# Patient Record
Sex: FEMALE | Race: BLACK OR AFRICAN AMERICAN | Employment: UNEMPLOYED
[De-identification: names, ages, dates, MRNs, and addresses within clinical notes are randomized per-mention and may not be internally consistent; named-entity substitution may affect disease eponyms.]

---

## 2024-08-08 ENCOUNTER — HOSPITAL ENCOUNTER (OUTPATIENT)
Facility: HOSPITAL | Age: 44
Setting detail: RECURRING SERIES
Discharge: HOME | End: 2024-08-11
Payer: OTHER GOVERNMENT

## 2024-08-08 PROCEDURE — 97535 SELF CARE MNGMENT TRAINING: CPT

## 2024-08-08 PROCEDURE — 97110 THERAPEUTIC EXERCISES: CPT

## 2024-08-08 PROCEDURE — 97161 PT EVAL LOW COMPLEX 20 MIN: CPT

## 2024-08-08 NOTE — PROGRESS NOTES
PHYSICAL / OCCUPATIONAL THERAPY - DAILY TREATMENT NOTE (updated )    Patient Name: Yolande Ryan    Date: 2024    : 1980  Insurance: Payor: [employer] / Plan: [employer] / Product Type: *No Product type* /      Patient  verified Yes     Visit #   Current / Total 1 15   Time   In / Out 8:22 9:10   Pain   In / Out 0 0   Subjective Functional Status/Changes: See POC     TREATMENT AREA =  Muscle weakness (generalized) [M62.81]    OBJECTIVE    24 min   Eval - untimed                      Therapeutic Procedures:  Tx Min Billable or 1:1 Min (if diff from Tx Min) Procedure, Rationale, Specifics   12  79753 Therapeutic Exercise (timed):  increase ROM, strength, coordination, balance, and proprioception to improve patient's ability to progress to PLOF and address remaining functional goals. (see flow sheet as applicable)     Details if applicable:  HEP instruction and demonstration     12  62533 Self Care/Home Management (timed):  improve patient knowledge and understanding of pain reducing techniques, positioning, posture/ergonomics, home safety, activity modification, diagnosis/prognosis, and physical therapy expectations, procedures and progression  to improve patient's ability to progress to PLOF and address remaining functional goals.  (see flow sheet as applicable)     Details if applicable:  pt education on relevant anatomy/physiology, pt education regarding prognosis with therapy, pt education on the possibility of obtaining an AFO to help with the lack of left ankle DF.    24  [facility] Totals Reminder: bill using total billable min of TIMED therapeutic procedures (example: do not include dry needle or estim unattended, both untimed codes, in totals to left)  8-22 min = 1 unit; 23-37 min = 2 units; 38-52 min = 3 units; 53-67 min = 4 units; 68-82 min = 5 units   Total Total     TOTAL TREATMENT TIME:        48     [x]  Patient Education billed concurrently with other procedures   [x] Review HEP    [] 
POC ; Examination:  MEDIUM Complexity : 3 Standardized tests and measures addressin body structure, function, activity limitation and / or participation in recreation  ;Presentation:  LOW Complexity : Stable, uncomplicated  ;Clinical Decision Making: Other Assessment Form or Objective Measure: TUG 20 seconds; 30 Second Sit to stand 3.5 = MODERATE Complexity  Overall Complexity Rating: LOW   Problem List: pain affecting function, decrease ROM, decrease strength, edema affection function, impaired gait/balance, decrease ADL/functional abilities, decrease activity tolerance, decrease flexibility/joint mobility, and decrease transfer abilities    Treatment Plan may include any combination of the followin Therapeutic Exercise, 37854 Neuromuscular Re-Education, 56871 Manual Therapy, 08900 Therapeutic Activity, 92242 Self Care/Home Management, 72572 Electrical Stim unattended, 39320 Electrical Stim attended, and 15233 Gait Training  Patient / Family readiness to learn indicated by: asking questions, trying to perform skills, interest, return verbalization , and return demonstration   Persons(s) to be included in education: patient (P)  Barriers to Learning/Limitations: none  Measures taken if barriers to learning present: n/a  Patient Goal (s): strengthen/improvement  Patient Self Reported Health Status: good  Rehabilitation Potential: good    Short Term Goals: To be accomplished in 2 treatments   Pt will report compliance and independence to HEP to help the pt manage their pain and symptoms.  Status at last note/certification:  established  Long Term Goals: To be accomplished in 15 treatments  Pt will improve her TUG time to 15 seconds with no AD  to improve the pt's fall risk  Status at last note/certification: 20 seconds, slow sit to stand, left foot drag  2.  Pt will increase MMT left hip flex to 3+/5 and knee EXT to 4-/5 to improve ability to return to PLOF.  Status at last note/certification: hip flex 3-/5,

## 2024-08-20 ENCOUNTER — HOSPITAL ENCOUNTER (OUTPATIENT)
Facility: HOSPITAL | Age: 44
Setting detail: RECURRING SERIES
Discharge: HOME | End: 2024-08-23
Payer: OTHER GOVERNMENT

## 2024-08-20 PROCEDURE — 97530 THERAPEUTIC ACTIVITIES: CPT | Performed by: GENERAL ACUTE CARE HOSPITAL

## 2024-08-20 PROCEDURE — 97112 NEUROMUSCULAR REEDUCATION: CPT | Performed by: GENERAL ACUTE CARE HOSPITAL

## 2024-08-20 NOTE — PROGRESS NOTES
PHYSICAL / OCCUPATIONAL THERAPY - DAILY TREATMENT NOTE (updated )    Patient Name: Yolande Ryan    Date: 2024    : 1980  Insurance: Payor: [employer] / Plan: [employer] / Product Type: *No Product type* /      Patient  verified yes     Visit #   Current / Total 2 15   Time   In / Out 904 953   Total Treatment Time 49   Pain   In / Out /10 LUE  0   Subjective Functional Status/Changes: Patient states her left shoulder/arm hurt a little today from sleeping in the wrong position.      NEXT PROGRESS NOTE DUE: 2024    TREATMENT AREA =  Muscle weakness (generalized) [M62.81]  Other abnormalities of gait and mobility [R26.89]    OBJECTIVE    Modalities Rationale:     decrease inflammation and decrease pain to improve patient's ability to progress to PLOF and address remaining functional goals.     min [] Estim Unattended, type/location:                                      []  w/ice    []  w/heat    min [] Estim Attended, type/location:                                     []  w/US     []  w/ice    []  w/heat    []  TENS insruct      min []  Mechanical Traction: type/lbs                   []  pro   []  sup   []  int   []  cont    []  before manual    []  after manual    min []  Ultrasound, settings/location:      min []  Iontophoresis w/ dexamethasone, location:                                               []  take home patch       []  in clinic    min  unbilled []  Ice     []  Heat    location/position:     min []  Paraffin,  details:     min []  Vasopneumatic Device, press/temp:     min []  Whirlpool / Fluido:    If using vaso (only need to measure limb vaso being performed on)      pre-treatment girth :       post-treatment girth :       measured at (landmark location) :      min []  Other:    Skin assessment post-treatment (if applicable):    []  intact    []  redness- no adverse reaction                 []redness - adverse reaction:      Vasopnuematic compression justification:  Per

## 2024-08-23 ENCOUNTER — HOSPITAL ENCOUNTER (OUTPATIENT)
Facility: HOSPITAL | Age: 44
Setting detail: RECURRING SERIES
Discharge: HOME | End: 2024-08-26
Payer: OTHER GOVERNMENT

## 2024-08-23 PROCEDURE — 97530 THERAPEUTIC ACTIVITIES: CPT | Performed by: GENERAL ACUTE CARE HOSPITAL

## 2024-08-23 PROCEDURE — 97112 NEUROMUSCULAR REEDUCATION: CPT | Performed by: GENERAL ACUTE CARE HOSPITAL

## 2024-08-23 PROCEDURE — 97116 GAIT TRAINING THERAPY: CPT | Performed by: GENERAL ACUTE CARE HOSPITAL

## 2024-08-23 PROCEDURE — 97110 THERAPEUTIC EXERCISES: CPT | Performed by: GENERAL ACUTE CARE HOSPITAL

## 2024-08-23 NOTE — PROGRESS NOTES
PHYSICAL / OCCUPATIONAL THERAPY - DAILY TREATMENT NOTE (updated )    Patient Name: Yolande Ryan    Date: 2024    : 1980  Insurance: Payor: [employer] / Plan: [employer] / Product Type: *No Product type* /      Patient  verified yes     Visit #   Current / Total 3 15   Time   In / Out 825 908    Total Treatment Time 43    Pain   In / Out /10 LUE  0    Subjective Functional Status/Changes: Patient states her left shoulder/arm hurt a little today from sleeping in the wrong position.      NEXT PROGRESS NOTE DUE: 2024    TREATMENT AREA =  Muscle weakness (generalized) [M62.81]  Other abnormalities of gait and mobility [R26.89]    OBJECTIVE    Modalities Rationale:     decrease inflammation and decrease pain to improve patient's ability to progress to PLOF and address remaining functional goals.     min [] Estim Unattended, type/location:                                      []  w/ice    []  w/heat    min [] Estim Attended, type/location:                                     []  w/US     []  w/ice    []  w/heat    []  TENS insruct      min []  Mechanical Traction: type/lbs                   []  pro   []  sup   []  int   []  cont    []  before manual    []  after manual    min []  Ultrasound, settings/location:      min []  Iontophoresis w/ dexamethasone, location:                                               []  take home patch       []  in clinic    min  unbilled []  Ice     []  Heat    location/position:     min []  Paraffin,  details:     min []  Vasopneumatic Device, press/temp:     min []  Whirlpool / Fluido:    If using vaso (only need to measure limb vaso being performed on)      pre-treatment girth :       post-treatment girth :       measured at (landmark location) :      min []  Other:    Skin assessment post-treatment (if applicable):    []  intact    []  redness- no adverse reaction                 []redness - adverse reaction:      Vasopnuematic compression justification:  Per

## 2024-08-27 ENCOUNTER — HOSPITAL ENCOUNTER (OUTPATIENT)
Facility: HOSPITAL | Age: 44
Setting detail: RECURRING SERIES
Discharge: HOME | End: 2024-08-30
Payer: OTHER GOVERNMENT

## 2024-08-27 PROCEDURE — 97110 THERAPEUTIC EXERCISES: CPT | Performed by: PHYSICAL THERAPIST

## 2024-08-27 PROCEDURE — 97112 NEUROMUSCULAR REEDUCATION: CPT | Performed by: PHYSICAL THERAPIST

## 2024-08-27 PROCEDURE — 97530 THERAPEUTIC ACTIVITIES: CPT | Performed by: PHYSICAL THERAPIST

## 2024-08-27 NOTE — PROGRESS NOTES
PHYSICAL / OCCUPATIONAL THERAPY - DAILY TREATMENT NOTE (updated )    Patient Name: Yolande Ryan    Date: 2024    : 1980  Insurance: Payor: [employer] / Plan: [employer]UM / Product Type: *No Product type* /      Patient  verified yes     Visit #   Current / Total 4 15   Time   In / Out 907 956   Total Treatment Time 49   Pain   In / Out 0 0    Subjective Functional Status/Changes: Pt retold subjective history.      NEXT PROGRESS NOTE DUE: 2024    TREATMENT AREA =  Muscle weakness (generalized) [M62.81]  Other abnormalities of gait and mobility [R26.89]    OBJECTIVE    Modalities Rationale:     decrease inflammation and decrease pain to improve patient's ability to progress to PLOF and address remaining functional goals.     min [] Estim Unattended, type/location:                                      []  w/ice    []  w/heat    min [] Estim Attended, type/location:                                     []  w/US     []  w/ice    []  w/heat    []  TENS insruct      min []  Mechanical Traction: type/lbs                   []  pro   []  sup   []  int   []  cont    []  before manual    []  after manual    min []  Ultrasound, settings/location:      min []  Iontophoresis w/ dexamethasone, location:                                               []  take home patch       []  in clinic    min  unbilled []  Ice     []  Heat    location/position:     min []  Paraffin,  details:     min []  Vasopneumatic Device, press/temp:     min []  Whirlpool / Fluido:    If using vaso (only need to measure limb vaso being performed on)      pre-treatment girth :       post-treatment girth :       measured at (landmark location) :      min []  Other:    Skin assessment post-treatment (if applicable):    []  intact    []  redness- no adverse reaction                 []redness - adverse reaction:      Vasopnuematic compression justification:  Per bilateral girth measures taken and listed above the edema is considered

## 2024-08-30 ENCOUNTER — HOSPITAL ENCOUNTER (OUTPATIENT)
Facility: HOSPITAL | Age: 44
Setting detail: RECURRING SERIES
End: 2024-08-30
Payer: OTHER GOVERNMENT

## 2024-08-30 PROCEDURE — 97530 THERAPEUTIC ACTIVITIES: CPT

## 2024-08-30 PROCEDURE — 97112 NEUROMUSCULAR REEDUCATION: CPT

## 2024-08-30 PROCEDURE — 97110 THERAPEUTIC EXERCISES: CPT

## 2024-08-30 NOTE — PROGRESS NOTES
PHYSICAL / OCCUPATIONAL THERAPY - DAILY TREATMENT NOTE (updated )    Patient Name: Yolande Ryan    Date: 2024    : 1980  Insurance: Payor: [employer] / Plan: [employer]UM / Product Type: *No Product type* /      Patient  verified yes     Visit #   Current / Total 5 15   Time   In / Out 09:06 9:52   Total Treatment Time 46   Pain   In / Out 0 0   Subjective Functional Status/Changes: Pt reports she still finds some difficulty with her home exercises for the L UE, but she is doing well at this time. She denies any significant soreness after the      NEXT PROGRESS NOTE DUE: 2024    TREATMENT AREA =  Muscle weakness (generalized) [M62.81]  Other abnormalities of gait and mobility [R26.89]    OBJECTIVE      Therapeutic Procedures:  Tx Min Billable or 1:1 Min (if diff from Tx Min) Procedure, Rationale, Specifics   11  51680 Therapeutic Exercise (timed):  increase ROM, strength, coordination, balance, and proprioception to improve patient's ability to progress to PLOF and address remaining functional goals. (see flow sheet as applicable)      Details if applicable:    SLRx3   Seated ball squeeze  Standing march  Standing hamstring curls   25  96250 Therapeutic Activity (timed):  use of dynamic activities replicating functional movements to increase ROM, strength, coordination, balance, and proprioception in order to improve patient's ability to progress to PLOF and address remaining functional goals.  (see flow sheet as applicable)      Details if applicable:    Pulleys  Bridges with [place] medball  Sit to stand assessment  Supine AAROM with 2# bar into flexion  Gripper   10  74329 Neuromuscular Re-Education (timed):  improve balance, coordination, kinesthetic sense, posture, core stability and proprioception to improve patient's ability to develop conscious control of individual muscles and awareness of position of extremities in order to progress to PLOF and address remaining functional goals. (see

## 2024-09-04 ENCOUNTER — HOSPITAL ENCOUNTER (OUTPATIENT)
Facility: HOSPITAL | Age: 44
Setting detail: RECURRING SERIES
Discharge: HOME | End: 2024-09-07
Payer: OTHER GOVERNMENT

## 2024-09-04 PROCEDURE — 97110 THERAPEUTIC EXERCISES: CPT | Performed by: PHYSICAL THERAPIST

## 2024-09-04 PROCEDURE — 97112 NEUROMUSCULAR REEDUCATION: CPT | Performed by: PHYSICAL THERAPIST

## 2024-09-04 PROCEDURE — 97530 THERAPEUTIC ACTIVITIES: CPT | Performed by: PHYSICAL THERAPIST

## 2024-09-04 NOTE — PROGRESS NOTES
PHYSICAL / OCCUPATIONAL THERAPY - DAILY TREATMENT NOTE (updated )    Patient Name: Yolande Ryan    Date: 2024    : 1980  Insurance: Payor: [employer]UM / Plan: [employer] OPTUM / Product Type: *No Product type* /      Patient  verified yes     Visit #   Current / Total 6 15   Time   In / Out 943 1030   Total Treatment Time 47   Pain   In / Out 0 0   Subjective Functional Status/Changes: No new complaints.     NEXT PROGRESS NOTE DUE: 2024    TREATMENT AREA =  Muscle weakness (generalized) [M62.81]  Other abnormalities of gait and mobility [R26.89]    OBJECTIVE      Therapeutic Procedures:  Tx Min Billable or 1:1 Min (if diff from Tx Min) Procedure, Rationale, Specifics   24  62051 Therapeutic Exercise (timed):  increase ROM, strength, coordination, balance, and proprioception to improve patient's ability to progress to PLOF and address remaining functional goals. (see flow sheet as applicable)      Details if applicable:    SLRx3   Standing march  Standing hamstring curls  ROM assessment   10  47190 Therapeutic Activity (timed):  use of dynamic activities replicating functional movements to increase ROM, strength, coordination, balance, and proprioception in order to improve patient's ability to progress to PLOF and address remaining functional goals.  (see flow sheet as applicable)      Details if applicable:    Pulleys  Bridges with [place] medball  Sit to stands     13  82467 Neuromuscular Re-Education (timed):  improve balance, coordination, kinesthetic sense, posture, core stability and proprioception to improve patient's ability to develop conscious control of individual muscles and awareness of position of extremities in order to progress to PLOF and address remaining functional goals. (see flow sheet as applicable)      Details if applicable:    Wall push ups  Standing 3 way hip  TB extension/rows    47  [facility] Totals Reminder: bill using total billable min of TIMED therapeutic procedures

## 2024-09-06 ENCOUNTER — HOSPITAL ENCOUNTER (OUTPATIENT)
Facility: HOSPITAL | Age: 44
Setting detail: RECURRING SERIES
Discharge: HOME | End: 2024-09-09
Payer: OTHER GOVERNMENT

## 2024-09-06 PROCEDURE — 97110 THERAPEUTIC EXERCISES: CPT | Performed by: GENERAL ACUTE CARE HOSPITAL

## 2024-09-06 PROCEDURE — 97112 NEUROMUSCULAR REEDUCATION: CPT | Performed by: GENERAL ACUTE CARE HOSPITAL

## 2024-09-06 PROCEDURE — 97530 THERAPEUTIC ACTIVITIES: CPT | Performed by: GENERAL ACUTE CARE HOSPITAL

## 2024-09-10 ENCOUNTER — HOSPITAL ENCOUNTER (OUTPATIENT)
Facility: HOSPITAL | Age: 44
Setting detail: RECURRING SERIES
Discharge: HOME | End: 2024-09-13
Payer: OTHER GOVERNMENT

## 2024-09-10 PROCEDURE — 97112 NEUROMUSCULAR REEDUCATION: CPT

## 2024-09-10 PROCEDURE — 97530 THERAPEUTIC ACTIVITIES: CPT

## 2024-09-10 PROCEDURE — 97110 THERAPEUTIC EXERCISES: CPT

## 2024-09-13 ENCOUNTER — HOSPITAL ENCOUNTER (OUTPATIENT)
Facility: HOSPITAL | Age: 44
Setting detail: RECURRING SERIES
Discharge: HOME | End: 2024-09-16
Payer: OTHER GOVERNMENT

## 2024-09-13 PROCEDURE — 97112 NEUROMUSCULAR REEDUCATION: CPT | Performed by: GENERAL ACUTE CARE HOSPITAL

## 2024-09-13 PROCEDURE — 97530 THERAPEUTIC ACTIVITIES: CPT | Performed by: GENERAL ACUTE CARE HOSPITAL

## 2024-09-17 ENCOUNTER — HOSPITAL ENCOUNTER (OUTPATIENT)
Facility: HOSPITAL | Age: 44
Setting detail: RECURRING SERIES
Discharge: HOME | End: 2024-09-20
Payer: OTHER GOVERNMENT

## 2024-09-17 PROCEDURE — 97110 THERAPEUTIC EXERCISES: CPT | Performed by: GENERAL ACUTE CARE HOSPITAL

## 2024-09-17 PROCEDURE — 97530 THERAPEUTIC ACTIVITIES: CPT | Performed by: GENERAL ACUTE CARE HOSPITAL

## 2024-09-17 PROCEDURE — 97112 NEUROMUSCULAR REEDUCATION: CPT | Performed by: GENERAL ACUTE CARE HOSPITAL

## 2024-09-20 ENCOUNTER — HOSPITAL ENCOUNTER (OUTPATIENT)
Facility: HOSPITAL | Age: 44
Setting detail: RECURRING SERIES
Discharge: HOME | End: 2024-09-23
Payer: OTHER GOVERNMENT

## 2024-09-20 PROCEDURE — 97110 THERAPEUTIC EXERCISES: CPT

## 2024-09-20 PROCEDURE — 97530 THERAPEUTIC ACTIVITIES: CPT

## 2024-09-20 PROCEDURE — 97112 NEUROMUSCULAR REEDUCATION: CPT

## 2024-10-01 ENCOUNTER — HOSPITAL ENCOUNTER (OUTPATIENT)
Facility: HOSPITAL | Age: 44
Setting detail: RECURRING SERIES
Discharge: HOME | End: 2024-10-04
Payer: OTHER GOVERNMENT

## 2024-10-01 PROCEDURE — 97530 THERAPEUTIC ACTIVITIES: CPT

## 2024-10-01 PROCEDURE — 97110 THERAPEUTIC EXERCISES: CPT

## 2024-10-01 PROCEDURE — 97112 NEUROMUSCULAR REEDUCATION: CPT

## 2024-10-01 NOTE — PROGRESS NOTES
PHYSICAL / OCCUPATIONAL THERAPY - DAILY TREATMENT NOTE (updated )    Patient Name: Yolande Ryan    Date: 10/1/2024    : 1980  Insurance: Payor: [employer] OPTUM / Plan: [employer] OPTUM / Product Type: *No Product type* /      Patient  verified yes     Visit #   Current / Total 5 8   Time   In / Out 12:33 1:07   Total Treatment Time 34   Pain   In / Out 0 0   Subjective Functional Status/Changes: Pt reports she is without significant pain today, but she isn't moving as well as she had hoped. She feels some generalized fatigue today.      NEXT PROGRESS NOTE DUE: 10/05/2024    TREATMENT AREA =  Muscle weakness (generalized) [M62.81]  Other abnormalities of gait and mobility [R26.89]    OBJECTIVE    Therapeutic Procedures:  Tx Min Billable or 1:1 Min (if diff from Tx Min) Procedure, Rationale, Specifics   10  01625 Therapeutic Exercise (timed):  increase ROM, strength, coordination, balance, and proprioception to improve patient's ability to progress to PLOF and address remaining functional goals. (see flow sheet as applicable)      Details if applicable:    DF AROM to small cassie height   DF heel tap to small cassie   Seated march to cassie   SLR  Sidelying hip abduction with CGA  Standing march  Standing hamstring curls   15  81195 Therapeutic Activity (timed):  use of dynamic activities replicating functional movements to increase ROM, strength, coordination, balance, and proprioception in order to improve patient's ability to progress to PLOF and address remaining functional goals.  (see flow sheet as applicable)      Details if applicable:    UBE  Small cone stacking over box   Clothes pins to DB rack standing    Full can to small cone   Step ups fwd  Seated cassie     9  61211 Neuromuscular Re-Education (timed):  improve balance, coordination, kinesthetic sense, posture, core stability and proprioception to improve patient's ability to develop conscious control of individual muscles and awareness of

## 2024-10-04 ENCOUNTER — APPOINTMENT (OUTPATIENT)
Facility: HOSPITAL | Age: 44
End: 2024-10-04
Payer: OTHER GOVERNMENT

## 2024-10-07 ENCOUNTER — APPOINTMENT (OUTPATIENT)
Facility: HOSPITAL | Age: 44
End: 2024-10-07
Payer: OTHER GOVERNMENT

## 2024-10-11 ENCOUNTER — APPOINTMENT (OUTPATIENT)
Facility: HOSPITAL | Age: 44
End: 2024-10-11
Payer: OTHER GOVERNMENT

## 2024-10-14 ENCOUNTER — APPOINTMENT (OUTPATIENT)
Facility: HOSPITAL | Age: 44
End: 2024-10-14
Payer: OTHER GOVERNMENT

## 2024-10-16 ENCOUNTER — HOSPITAL ENCOUNTER (OUTPATIENT)
Facility: HOSPITAL | Age: 44
Setting detail: RECURRING SERIES
Discharge: HOME | End: 2024-10-19
Payer: OTHER GOVERNMENT

## 2024-10-16 PROCEDURE — 97110 THERAPEUTIC EXERCISES: CPT | Performed by: PHYSICAL THERAPIST

## 2024-10-16 PROCEDURE — 97112 NEUROMUSCULAR REEDUCATION: CPT | Performed by: PHYSICAL THERAPIST

## 2024-10-16 NOTE — PROGRESS NOTES
encouragement    Progress to Goals:      LONG-TERM GOALS TO BE ACHIEVED IN 8 treatments:   The patient will improve left shoulder strength to 4+/5 globally for improved ADL tolerance.  Status at last assessment (09/06/2024):   flex 4-/5, abduction 3/5, ER 3/5, IR 4/5    Current:  gross L shoulder strength in all planes 4/5 10/16/24     The patient will improve left hip strength globally to 5/5 globally for improved walking tolerance.   Status at last assessment (09/06/2024):  flexion 4-/5   Current:  flexion: 4+/5, abduction: 3/5, extension: 4/5 with max encouragement 10/16/24     The patient will improve left ankle strength to 4/5 globally for improved walking tolerance.   Status at last assessment (09/06/2024):  3/5 globally   Current: 5/5 DF, 4/5 PF 10/16/24     The patient will perform TUG test in 10 seconds for improved functional mobility.  Status at last assessment (09/06/2024):  14 seconds   Current:  9 seconds 10/16/24     The patient will perform 10 repetitions of sit to stands in 30 seconds for improved functional mobility.   Status at last assessment (09/06/2024):  7 reps   Current:10 reps in 30 seconds 10/16/24    RECOMMENDATIONS  Discharge due to : Pt requested with updated HEP, pt feels like she can manage IND at home to maintain/maximize gains in PT       If you have any questions/comments please contact us directly at [phone].   Thank you for allowing us to assist in the care of your patient.        Therapist Signature: Vickie Bates PT Date: 10/16/24   Reporting Period: 8/8/24-10/16/24 Time: 9:07 AM

## 2024-10-16 NOTE — PROGRESS NOTES
remaining functional goals. (see flow sheet as applicable)      Details if applicable:    Fall risk assessment   33  [facility] Totals Reminder: bill using total billable min of TIMED therapeutic procedures (example: do not include dry needle or estim unattended, both untimed codes, in totals to left)  8-22 min = 1 unit; 23-37 min = 2 units; 38-52 min = 3 units; 53-67 min = 4 units; 68-82 min = 5 units   Total Total     [x]  Patient Education billed concurrently with other procedures   [x] Review HEP    [] Progressed/Changed HEP, detail:    [] Other detail:       Objective Information/Functional Measures/Assessment:  L shoulder strength: gross shoulder strength: 4/5 in all planes  L hip strength: flexion: 4+/5, abduction: 3/5, extension: 4/5 with max encouragement  L knee flexion: 4+/5, extension: 5/5  L: DF strength: 5/5  TU seconds   Sit to stands: 10 reps in 30 seconds  SL HR on the L: 10 reps with max encouragement    Progress toward goals / Updated goals:  []  See Progress Note/Recertification      LONG-TERM GOALS TO BE ACHIEVED IN 8 treatments:   The patient will improve left shoulder strength to 4+/5 globally for improved ADL tolerance.  Status at last assessment (2024):   flex 4-/5, abduction 3/5, ER 3/5, IR 4/5    Current:  gross L shoulder strength in all planes 4/5 10/16/24    The patient will improve left hip strength globally to 5/5 globally for improved walking tolerance.   Status at last assessment (2024):  flexion 4-/5   Current:  flexion: 4+/5, abduction: 3/5, extension: 4/5 with max encouragement 10/16/24    The patient will improve left ankle strength to 4/5 globally for improved walking tolerance.   Status at last assessment (2024):  3/5 globally   Current: 5/5 DF, 4/5 PF 10/16/24    The patient will perform TUG test in 10 seconds for improved functional mobility.  Status at last assessment (2024):  14 seconds   Current:  9 seconds 10/16/24    The patient will perform 10

## 2024-10-17 ENCOUNTER — HOSPITAL ENCOUNTER (OUTPATIENT)
Facility: HOSPITAL | Age: 44
Setting detail: RECURRING SERIES
End: 2024-10-17
Payer: OTHER GOVERNMENT